# Patient Record
Sex: MALE | Race: BLACK OR AFRICAN AMERICAN | Employment: UNEMPLOYED | URBAN - METROPOLITAN AREA
[De-identification: names, ages, dates, MRNs, and addresses within clinical notes are randomized per-mention and may not be internally consistent; named-entity substitution may affect disease eponyms.]

---

## 2020-09-15 ENCOUNTER — APPOINTMENT (OUTPATIENT)
Dept: GENERAL RADIOLOGY | Age: 25
End: 2020-09-15
Attending: EMERGENCY MEDICINE
Payer: COMMERCIAL

## 2020-09-15 ENCOUNTER — HOSPITAL ENCOUNTER (EMERGENCY)
Age: 25
Discharge: HOME | End: 2020-09-15
Attending: EMERGENCY MEDICINE
Payer: COMMERCIAL

## 2020-09-15 VITALS
OXYGEN SATURATION: 99 % | TEMPERATURE: 98.4 F | RESPIRATION RATE: 16 BRPM | SYSTOLIC BLOOD PRESSURE: 132 MMHG | HEART RATE: 64 BPM | DIASTOLIC BLOOD PRESSURE: 74 MMHG

## 2020-09-15 DIAGNOSIS — S81.811A LACERATION OF RIGHT LEG EXCLUDING THIGH, INITIAL ENCOUNTER: Primary | ICD-10-CM

## 2020-09-15 PROCEDURE — 99282 EMERGENCY DEPT VISIT SF MDM: CPT

## 2020-09-15 PROCEDURE — 73590 X-RAY EXAM OF LOWER LEG: CPT

## 2020-09-15 PROCEDURE — 75810000293 HC SIMP/SUPERF WND  RPR

## 2020-09-15 NOTE — ED TRIAGE NOTES
Patient arrives with complaint of Right calf pain from injury. Patient was playing with his child when his foot went through the glass screen door. He reports that it sliced his leg.   He cleaned it out the best he could prior to arrival.

## 2020-09-15 NOTE — DISCHARGE INSTRUCTIONS
Keep area clean and dry. The area was sutures, you may clean gently with some peroxide and had antibiotic ointment and Band-Aid. Allow the Steri-Strips to fall off in 5 to 7 days. Recheck for signs of infection. The sutures will fall out on their own in 1 week. Patient Education        Cuts: Care Instructions  Your Care Instructions  A cut can happen anywhere on your body. Stitches, staples, skin adhesives, or pieces of tape called Steri-Strips are sometimes used to keep the edges of a cut together and help it heal. Steri-Strips can be used by themselves or with stitches or staples. Sometimes cuts are left open. If the cut went deep and through the skin, the doctor may have closed the cut in two layers. A deeper layer of stitches brings the deep part of the cut together. These stitches will dissolve and don't need to be removed. The upper layer closure, which could be stitches, staples, Steri-Strips, or adhesive, is what you see on the cut. A cut is often covered by a bandage. The doctor has checked you carefully, but problems can develop later. If you notice any problems or new symptoms, get medical treatment right away. Follow-up care is a key part of your treatment and safety. Be sure to make and go to all appointments, and call your doctor if you are having problems. It's also a good idea to know your test results and keep a list of the medicines you take. How can you care for yourself at home? If a cut is open or closed  · Prop up the sore area on a pillow anytime you sit or lie down during the next 3 days. Try to keep it above the level of your heart. This will help reduce swelling. · Keep the cut dry for the first 24 to 48 hours. After this, you can shower if your doctor okays it. Pat the cut dry. · Don't soak the cut, such as in a bathtub. Your doctor will tell you when it's safe to get the cut wet.   · After the first 24 to 48 hours, clean the cut with soap and water 2 times a day unless your doctor gives you different instructions. ? Don't use hydrogen peroxide or alcohol, which can slow healing. ? You may cover the cut with a thin layer of petroleum jelly and a nonstick bandage. ? If the doctor put a bandage over the cut, put on a new bandage after cleaning the cut or if the bandage gets wet or dirty. · Avoid any activity that could cause your cut to reopen. · Be safe with medicines. Read and follow all instructions on the label. ? If the doctor gave you a prescription medicine for pain, take it as prescribed. ? If you are not taking a prescription pain medicine, ask your doctor if you can take an over-the-counter medicine. If the cut is closed with stitches, staples, or Steri-Strips  · Follow the above instructions for open or closed cuts. · Do not remove the stitches or staples on your own. Your doctor will tell you when to come back to have the stitches or staples removed. · Leave Steri-Strips on until they fall off. If the cut is closed with a skin adhesive  · Follow the above instructions for open or closed cuts. · Leave the skin adhesive on your skin until it falls off on its own. This may take 5 to 10 days. · Do not scratch, rub, or pick at the adhesive. · Do not put the sticky part of a bandage directly on the adhesive. · Do not put any kind of ointment, cream, or lotion over the area. This can make the adhesive fall off too soon. Do not use hydrogen peroxide or alcohol, which can slow healing. When should you call for help? Call your doctor now or seek immediate medical care if:    · You have new pain, or your pain gets worse.     · The skin near the cut is cold or pale or changes color.     · You have tingling, weakness, or numbness near the cut.     · The cut starts to bleed, and blood soaks through the bandage.  Oozing small amounts of blood is normal.     · You have trouble moving the area near the cut.     · You have symptoms of infection, such as:  ? Increased pain, swelling, warmth, or redness around the cut.  ? Red streaks leading from the cut.  ? Pus draining from the cut.  ? A fever. Watch closely for changes in your health, and be sure to contact your doctor if:    · The cut reopens.     · You do not get better as expected. Where can you learn more? Go to http://raymond-kurtis.info/  Enter M735 in the search box to learn more about \"Cuts: Care Instructions. \"  Current as of: June 26, 2019               Content Version: 12.6  © 9378-2067 [employer]. Care instructions adapted under license by [employer] (which disclaims liability or warranty for this information). If you have questions about a medical condition or this instruction, always ask your healthcare professional. [address] any warranty or liability for your use of this information.

## 2020-09-15 NOTE — ED PROVIDER NOTES
60-year-old male accidentally hit a sliding glass door with his foot. The glass broke. Complains of lacerations to his right leg. Tetanus up-to-date. No numbness or weakness. The history is provided by the patient. Laceration    The incident occurred 1 to 2 hours ago. The laceration is located on the right leg. The injury mechanism is broken glass. Foreign body present: unknown. Possible foreign bodies present include glass. The pain is mild. Pertinent negatives include no numbness and no weakness. The patient's last tetanus shot was less than 5 years ago. No past medical history on file. No past surgical history on file. No family history on file.     Social History     Socioeconomic History    Marital status: SINGLE     Spouse name: Not on file    Number of children: Not on file    Years of education: Not on file    Highest education level: Not on file   Occupational History    Not on file   Social Needs    Financial resource strain: Not on file    Food insecurity     Worry: Not on file     Inability: Not on file    Transportation needs     Medical: Not on file     Non-medical: Not on file   Tobacco Use    Smoking status: Not on file   Substance and Sexual Activity    Alcohol use: Not on file    Drug use: Not on file    Sexual activity: Not on file   Lifestyle    Physical activity     Days per week: Not on file     Minutes per session: Not on file    Stress: Not on file   Relationships    Social connections     Talks on phone: Not on file     Gets together: Not on file     Attends Zoroastrianism service: Not on file     Active member of club or organization: Not on file     Attends meetings of clubs or organizations: Not on file     Relationship status: Not on file    Intimate partner violence     Fear of current or ex partner: Not on file     Emotionally abused: Not on file     Physically abused: Not on file     Forced sexual activity: Not on file   Other Topics Concern    Not on file   Social History Narrative    Not on file         ALLERGIES: Sulfa (sulfonamide antibiotics)    Review of Systems   Neurological: Negative for weakness and numbness. There were no vitals filed for this visit. Physical Exam  Vitals signs and nursing note reviewed. Constitutional:       Appearance: He is not ill-appearing. Skin:     General: Skin is warm and dry. Comments: Multiple small lacerations anterior lateral aspect of the right tib-fib region. Distal neurovascular intact. No active bleeding. Neurological:      Mental Status: He is alert. MDM  Number of Diagnoses or Management Options  Diagnosis management comments: Imaging to rule out glass foreign body. Clean wounds and assess for need it may need sutures. Amount and/or Complexity of Data Reviewed  Tests in the radiology section of CPT®: ordered and reviewed    Risk of Complications, Morbidity, and/or Mortality  Presenting problems: low  Diagnostic procedures: minimal  Management options: low    Patient Progress  Patient progress: stable         Wound Repair    Date/Time: 9/15/2020 7:52 PM  Preparation: skin prepped with Betadine  Location details: right leg  Wound length:2.5 cm or less  Anesthesia: local infiltration    Anesthesia:  Local Anesthetic: lidocaine 1% without epinephrine  Foreign bodies: no foreign bodies  Irrigation solution: saline  Skin closure: gut  Number of sutures: 5  Technique: simple  Approximation: close  Dressing: 4x4 and antibiotic ointment  Comments: 2 lacerations with total about 2 cm repaired with plain gut. Very superficial but gapped lacerations. All the lacerations which were superficial came together well with quarter inch Steri-Strips. Results Include:    No results found for this or any previous visit (from the past 24 hour(s)). Xr Tib/fib Rt    Result Date: 9/15/2020  EXAMINATION: XR TIB/FIB RT 9/15/2020 6:49 PM COMPARISON: None available.   INDICATION: R leg pain and laceration, rule out glass foreign body TECHNIQUE: 2 views of the right tibia and fibula were obtained FINDINGS: There is no fracture or acute abnormality. Joint spacing and alignment are maintained. Bony mineralization is preserved. The surrounding soft tissues are normal. No radiopaque foreign body. IMPRESSION: No acute abnormality. No radiopaque foreign body is identified. Of note, subtle glass can be radiolucent.

## 2020-09-15 NOTE — LETTER
[address] [address] EMERGENCY DEPT 
[address] [facility] [id number] [phone] Work/School Note Date: 9/15/2020 To Whom It May concern: 
 
Porter Solorzano was seen and treated today in the emergency room by the following provider(s): 
Attending Provider: Vilma Peoples 13 may return to work on 9/17. Sincerely, Masha Mon MD

## 2024-05-28 ENCOUNTER — APPOINTMENT (OUTPATIENT)
Dept: GENERAL RADIOLOGY | Age: 29
End: 2024-05-28
Payer: OTHER MISCELLANEOUS

## 2024-05-28 ENCOUNTER — HOSPITAL ENCOUNTER (EMERGENCY)
Age: 29
Discharge: HOME | End: 2024-05-28
Payer: OTHER MISCELLANEOUS

## 2024-05-28 VITALS
RESPIRATION RATE: 16 BRPM | TEMPERATURE: 97.9 F | DIASTOLIC BLOOD PRESSURE: 73 MMHG | HEIGHT: 67 IN | WEIGHT: 185 LBS | HEART RATE: 64 BPM | SYSTOLIC BLOOD PRESSURE: 125 MMHG | OXYGEN SATURATION: 99 % | BODY MASS INDEX: 29.03 KG/M2
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
## 2024-05-28 DIAGNOSIS — S39.012A STRAIN OF LUMBAR REGION, INITIAL ENCOUNTER: ICD-10-CM

## 2024-05-28 PROCEDURE — 6370000000 HC RX 637 (ALT 250 FOR IP)

## 2024-05-28 PROCEDURE — 72080 X-RAY EXAM THORACOLMB 2/> VW: CPT

## 2024-05-28 PROCEDURE — 99284 EMERGENCY DEPT VISIT MOD MDM: CPT

## 2024-05-28 RX ORDER — ALBUTEROL SULFATE 90 UG/1
2 AEROSOL, METERED RESPIRATORY (INHALATION) EVERY 4 HOURS PRN
COMMUNITY
Start: 2021-12-08

## 2024-05-28 RX ORDER — METHOCARBAMOL 750 MG/1
750 TABLET, FILM COATED ORAL 3 TIMES DAILY PRN
Qty: 30 TABLET | Refills: 0 | Status: SHIPPED | OUTPATIENT
Start: 2024-05-28 | End: 2024-06-07

## 2024-05-28 RX ORDER — IBUPROFEN 800 MG/1
800 TABLET ORAL
Status: COMPLETED | OUTPATIENT
Start: 2024-05-28 | End: 2024-05-28

## 2024-05-28 RX ADMIN — IBUPROFEN 800 MG: 800 TABLET, FILM COATED ORAL at 21:12

## 2024-05-28 ASSESSMENT — PAIN DESCRIPTION - LOCATION
LOCATION: BACK
LOCATION: BACK

## 2024-05-28 ASSESSMENT — PAIN SCALES - GENERAL
PAINLEVEL_OUTOF10: 6
PAINLEVEL_OUTOF10: 6

## 2024-05-28 ASSESSMENT — PAIN DESCRIPTION - ORIENTATION: ORIENTATION: LOWER

## 2024-05-28 ASSESSMENT — PAIN - FUNCTIONAL ASSESSMENT: PAIN_FUNCTIONAL_ASSESSMENT: 0-10

## 2024-05-28 ASSESSMENT — PAIN DESCRIPTION - PAIN TYPE: TYPE: ACUTE PAIN

## 2024-05-28 ASSESSMENT — LIFESTYLE VARIABLES: HOW OFTEN DO YOU HAVE A DRINK CONTAINING ALCOHOL: MONTHLY OR LESS

## 2024-05-28 ASSESSMENT — PAIN DESCRIPTION - FREQUENCY: FREQUENCY: INTERMITTENT
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
## 2024-05-29 NOTE — ED PROVIDER NOTES
nursing note reviewed:  Vitals:    05/28/24 2039   BP: 125/73   Pulse: 64   Resp: 16   Temp: 97.9 °F (36.6 °C)   TempSrc: Oral   SpO2: 99%   Weight: 83.9 kg (185 lb)   Height: 1.702 m (5' 7\")      Physical Exam  Vitals and nursing note reviewed.   Constitutional:       General: He is not in acute distress.     Appearance: Normal appearance. He is not ill-appearing.   HENT:      Head: Normocephalic and atraumatic.   Eyes:      Extraocular Movements: Extraocular movements intact.      Conjunctiva/sclera: Conjunctivae normal.      Pupils: Pupils are equal, round, and reactive to light.   Cardiovascular:      Rate and Rhythm: Normal rate.   Pulmonary:      Effort: Pulmonary effort is normal. No respiratory distress.   Musculoskeletal:         General: Tenderness present. No swelling. Normal range of motion.      Cervical back: Normal range of motion and neck supple. No rigidity.      Comments: On examination there is midline spinal tenderness in the lower thoracic upper lumbar spine with left-sided thoracic tenderness.  No palpable step-offs or deformities on examination.  Patient is ambulatory without difficulty.   Skin:     General: Skin is warm and dry.   Neurological:      Mental Status: He is alert.        Procedures     Procedures    Orders Placed This Encounter   Procedures    XR THORACOLUMBAR SPINE (MIN 2 VIEWS)        Medications given during this emergency department visit:  Medications   ibuprofen (ADVIL;MOTRIN) tablet 800 mg (800 mg Oral Given 5/28/24 2112)       New Prescriptions    METHOCARBAMOL (ROBAXIN-750) 750 MG TABLET    Take 1 tablet by mouth 3 times daily as needed (Muscle spasm)        Past Medical History:   Diagnosis Date    Asthma     Migraines         History reviewed. No pertinent surgical history.     Social History     Socioeconomic History    Marital status: Single     Spouse name: None    Number of children: None    Years of education: None    Highest education level: None   Tobacco Use
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
## 2024-06-03 ENCOUNTER — TELEPHONE (OUTPATIENT)
Dept: ORTHOPEDIC SURGERY | Age: 29
End: 2024-06-03

## 2024-06-03 ENCOUNTER — HOSPITAL ENCOUNTER (EMERGENCY)
Age: 29
Discharge: HOME | End: 2024-06-03
Attending: STUDENT IN AN ORGANIZED HEALTH CARE EDUCATION/TRAINING PROGRAM
Payer: COMMERCIAL

## 2024-06-03 VITALS
BODY MASS INDEX: 29.03 KG/M2 | OXYGEN SATURATION: 97 % | DIASTOLIC BLOOD PRESSURE: 81 MMHG | HEIGHT: 67 IN | HEART RATE: 77 BPM | SYSTOLIC BLOOD PRESSURE: 130 MMHG | RESPIRATION RATE: 18 BRPM | WEIGHT: 185 LBS | TEMPERATURE: 97.8 F

## 2024-06-03 DIAGNOSIS — S39.012A BACK STRAIN, INITIAL ENCOUNTER: Primary | ICD-10-CM

## 2024-06-03 PROCEDURE — 6370000000 HC RX 637 (ALT 250 FOR IP): Performed by: STUDENT IN AN ORGANIZED HEALTH CARE EDUCATION/TRAINING PROGRAM

## 2024-06-03 PROCEDURE — 6360000002 HC RX W HCPCS: Performed by: STUDENT IN AN ORGANIZED HEALTH CARE EDUCATION/TRAINING PROGRAM

## 2024-06-03 PROCEDURE — 99284 EMERGENCY DEPT VISIT MOD MDM: CPT

## 2024-06-03 PROCEDURE — 96372 THER/PROPH/DIAG INJ SC/IM: CPT

## 2024-06-03 RX ORDER — LIDOCAINE 50 MG/G
1 PATCH TOPICAL DAILY
Qty: 10 PATCH | Refills: 0 | Status: SHIPPED | OUTPATIENT
Start: 2024-06-03 | End: 2024-06-13

## 2024-06-03 RX ORDER — MELOXICAM 15 MG/1
15 TABLET ORAL DAILY
Qty: 30 TABLET | Refills: 0 | Status: SHIPPED | OUTPATIENT
Start: 2024-06-03

## 2024-06-03 RX ORDER — METHOCARBAMOL 750 MG/1
750 TABLET, FILM COATED ORAL 3 TIMES DAILY
Qty: 21 TABLET | Refills: 0 | Status: SHIPPED | OUTPATIENT
Start: 2024-06-03 | End: 2024-06-10

## 2024-06-03 RX ORDER — KETOROLAC TROMETHAMINE 15 MG/ML
15 INJECTION, SOLUTION INTRAMUSCULAR; INTRAVENOUS ONCE
Status: COMPLETED | OUTPATIENT
Start: 2024-06-03 | End: 2024-06-03

## 2024-06-03 RX ORDER — LIDOCAINE 4 G/G
1 PATCH TOPICAL ONCE
Status: DISCONTINUED | OUTPATIENT
Start: 2024-06-03 | End: 2024-06-03 | Stop reason: HOSPADM

## 2024-06-03 RX ADMIN — KETOROLAC TROMETHAMINE 15 MG: 15 INJECTION, SOLUTION INTRAMUSCULAR; INTRAVENOUS at 03:37

## 2024-06-03 ASSESSMENT — PAIN SCALES - GENERAL: PAINLEVEL_OUTOF10: 8

## 2024-06-03 ASSESSMENT — LIFESTYLE VARIABLES
HOW MANY STANDARD DRINKS CONTAINING ALCOHOL DO YOU HAVE ON A TYPICAL DAY: 1 OR 2
HOW OFTEN DO YOU HAVE A DRINK CONTAINING ALCOHOL: MONTHLY OR LESS

## 2024-06-03 ASSESSMENT — PAIN - FUNCTIONAL ASSESSMENT: PAIN_FUNCTIONAL_ASSESSMENT: 0-10
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
## 2024-06-03 NOTE — DISCHARGE INSTRUCTIONS
You may take Tylenol in addition to the prescribed medication.  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  Return to the ER at any time if any new or worsening symptoms or if you develop any numbness in your groin or any issues controlling your bowel or bladder.      If any further absence from work is needed, you will need to follow-up with through your suazo or through your employee health in regards to consideration of FMLA.
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
## 2024-06-05 NOTE — PROGRESS NOTES
Khloedarrelantonino Song  : 1995  Primary: Ese Dao (Ruel VALDOVINOS)  Secondary:  [facility] @ [place] [address]  [place] [address]  Phone: [phone]  Fax: [phone] Plan Frequency: 2x week up to 90 days (potentially 1x a week for up to 90 days for pt preference)  Plan of Care/Certification Expiration Date: 24        Plan of Care/Certification Expiration Date:  Plan of Care/Certification Expiration Date: 24    Frequency/Duration: Plan Frequency: 2x week up to 90 days (potentially 1x a week for up to 90 days for pt preference)      Time In/Out:   Time In: 1145  Time Out: 1219      PT Visit Info:    Total # of Visits to Date: 1  Progress Note Counter: 1      Visit Count:  1    OUTPATIENT PHYSICAL THERAPY:   Treatment Note 2024       Episode  (LBP)               Treatment Diagnosis:    No data found  Medical/Referring Diagnosis:    Back strain, initial encounter    Referring Physician:  Zain Aranda MD MD Orders:  PT Eval and Treat   Return MD Appt:     Date of Onset:  No data recorded   Allergies:   Sulfa antibiotics  Restrictions/Precautions:   None      Interventions Planned (Treatment may consist of any combination of the following):     See Assessment Note    Subjective Comments:   See eval   Initial Pain Level:: /10  Post Session Pain Level:  /10  Medications Last Reviewed:  2024  Updated Objective Findings:  See Evaluation Note from today  Treatment   THERAPEUTIC EXERCISE: (23 minutes):    Exercises per grid below to improve mobility, strength, balance and coordination.  Required no visual, verbal, manual and tactile cues to promote proper body alignment, promote proper body posture, promote proper body mechanics and promote proper body breathing techniques.  Progressed resistance, range, repetitions and complexity of movement as indicated.    THERAPEUTIC ACTIVITY: (  minutes):    Therapeutic activities per grid below to improve mobility,
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
## 2024-06-06 ENCOUNTER — HOSPITAL ENCOUNTER (OUTPATIENT)
Dept: PHYSICAL THERAPY | Age: 29
Setting detail: RECURRING SERIES
Discharge: HOME | End: 2024-06-09
Attending: STUDENT IN AN ORGANIZED HEALTH CARE EDUCATION/TRAINING PROGRAM
Payer: COMMERCIAL

## 2024-06-06 DIAGNOSIS — M54.51 VERTEBROGENIC LOW BACK PAIN: ICD-10-CM

## 2024-06-06 DIAGNOSIS — M54.59 OTHER LOW BACK PAIN: Primary | ICD-10-CM

## 2024-06-06 PROCEDURE — 97110 THERAPEUTIC EXERCISES: CPT

## 2024-06-06 PROCEDURE — 97162 PT EVAL MOD COMPLEX 30 MIN: CPT

## 2024-06-12 ENCOUNTER — HOSPITAL ENCOUNTER (OUTPATIENT)
Dept: PHYSICAL THERAPY | Age: 29
Setting detail: RECURRING SERIES
Discharge: HOME | End: 2024-06-15
Attending: STUDENT IN AN ORGANIZED HEALTH CARE EDUCATION/TRAINING PROGRAM
Payer: COMMERCIAL

## 2024-06-12 PROCEDURE — 97110 THERAPEUTIC EXERCISES: CPT

## 2024-06-12 ASSESSMENT — PAIN SCALES - GENERAL: PAINLEVEL_OUTOF10: 7

## 2024-06-12 NOTE — PROGRESS NOTES
Juli Duncan  : 1995  Primary: Ese Dao (Ruel VALDOVINOS)  Secondary:  [facility] @ [place] [address]  [place] [address]  Phone: [phone]  Fax: [phone] Plan Frequency: 2x week up to 90 days (potentially 1x a week for up to 90 days for pt preference)  Plan of Care/Certification Expiration Date: 24        Plan of Care/Certification Expiration Date:  Plan of Care/Certification Expiration Date: 24    Frequency/Duration: Plan Frequency: 2x week up to 90 days (potentially 1x a week for up to 90 days for pt preference)      Time In/Out:   Time In: 1352  Time Out: 1435      PT Visit Info:    Total # of Visits to Date: 2  Progress Note Counter: 2      Visit Count:  2    OUTPATIENT PHYSICAL THERAPY:   Treatment Note 2024       Episode  (LBP)               Treatment Diagnosis:    No data found  Medical/Referring Diagnosis:    Back strain, initial encounter    Referring Physician:  Zain Aranda MD MD Orders:  PT Eval and Treat   Return MD Appt:     Date of Onset:  No data recorded   Allergies:   Sulfa antibiotics  Restrictions/Precautions:   None      Interventions Planned (Treatment may consist of any combination of the following):     See Assessment Note    Subjective Comments:  Been trying to keep moving, but walking too much makes him sore then stiffens up.      Initial Pain Level:: 7/10  Post Session Pain Level:  5/10  Medications Last Reviewed:  2024  Updated Objective Findings:  None Today  Treatment   THERAPEUTIC EXERCISE: (42 minutes):    Exercises per grid below to improve mobility, strength, balance and coordination.  Required no visual, verbal, manual and tactile cues to promote proper body alignment, promote proper body posture, promote proper body mechanics and promote proper body breathing techniques.  Progressed resistance, range, repetitions and complexity of movement as indicated.    THERAPEUTIC ACTIVITY: (  minutes):

## 2024-06-14 ENCOUNTER — HOSPITAL ENCOUNTER (OUTPATIENT)
Dept: PHYSICAL THERAPY | Age: 29
Setting detail: RECURRING SERIES
Discharge: HOME | End: 2024-06-17
Attending: STUDENT IN AN ORGANIZED HEALTH CARE EDUCATION/TRAINING PROGRAM
Payer: COMMERCIAL

## 2024-06-14 PROCEDURE — 97110 THERAPEUTIC EXERCISES: CPT

## 2024-06-14 NOTE — PROGRESS NOTES
Supine - leg outs     Attempted marching- burning  - so did leg outs x 10 B with no burn                               Treatment/Session Summary:    Treatment Assessment:   Patient did well with additional exercises today.  Did have slight strain, but not painful. Continue with core stability and stretching.      Communication/Consultation:  None today  Equipment provided today:  None  Recommendations/Intent for next treatment session: Next visit will focus on neutral spine posture strengthening, BLE strengthening and pain modulation.    >Total Treatment Billable Duration:  44 minutes   Time In: 1516  Time Out: 1601    Tata Lopez PTA         Charge Capture  Events  [facility]  Appt Desk  Attendance Report     Future Appointments   Date Time Provider Department Center   6/18/2024  3:00 PM Tan Conklin, PT [facility]   6/20/2024  3:00 PM Tan Conklin, PT [facility]   6/25/2024 11:45 AM Tan Conklin, PT [facility]   6/28/2024 11:00 AM Tata Lopez PTA [facility]   7/2/2024 11:45 AM Tan Conklin, PT [facility]   7/5/2024 11:00 AM Tan Conklin, PT [facility]

## 2024-06-18 ENCOUNTER — HOSPITAL ENCOUNTER (OUTPATIENT)
Dept: PHYSICAL THERAPY | Age: 29
Setting detail: RECURRING SERIES
Discharge: HOME | End: 2024-06-21
Attending: STUDENT IN AN ORGANIZED HEALTH CARE EDUCATION/TRAINING PROGRAM
Payer: COMMERCIAL

## 2024-06-18 PROCEDURE — 97110 THERAPEUTIC EXERCISES: CPT

## 2024-06-18 PROCEDURE — 97112 NEUROMUSCULAR REEDUCATION: CPT

## 2024-06-18 NOTE — PROGRESS NOTES
Khloedarrelantonino Song  : 1995  Primary: Ese Dao (Ruel VALDOVINOS)  Secondary:  [facility] @ [place] [address]  [place] [address]  Phone: [phone]  Fax: [phone] Plan Frequency: 2x week up to 90 days (potentially 1x a week for up to 90 days for pt preference)  Plan of Care/Certification Expiration Date: 24        Plan of Care/Certification Expiration Date:  Plan of Care/Certification Expiration Date: 24    Frequency/Duration: Plan Frequency: 2x week up to 90 days (potentially 1x a week for up to 90 days for pt preference)      Time In/Out:   Time In: 1500  Time Out: 1540      PT Visit Info:    Total # of Visits to Date: 4  Progress Note Counter: 4      Visit Count:  4    OUTPATIENT PHYSICAL THERAPY:   Treatment Note 2024       Episode  (LBP)               Treatment Diagnosis:    No data found  Medical/Referring Diagnosis:    Back strain, initial encounter    Referring Physician:  Zain Aranda MD MD Orders:  PT Eval and Treat   Return MD Appt:     Date of Onset:  No data recorded   Allergies:   Sulfa antibiotics  Restrictions/Precautions:   None      Interventions Planned (Treatment may consist of any combination of the following):     See Assessment Note    Subjective Comments:  pt states that he felt better after last session, still has some stiffness and sharp pain after waking in the morning or sitting in a chair for a long period of time.     Initial Pain Level:: 5-6.5 /10  Post Session Pain Level:  5.5/10  Medications Last Reviewed:  2024  Updated Objective Findings:  None Today  Treatment   THERAPEUTIC EXERCISE: (15 minutes):    Exercises per grid below to improve mobility, strength, balance and coordination.  Required no visual, verbal, manual and tactile cues to promote proper body alignment, promote proper body posture, promote proper body mechanics and promote proper body breathing techniques.  Progressed resistance, range,

## 2024-06-20 ENCOUNTER — HOSPITAL ENCOUNTER (OUTPATIENT)
Dept: PHYSICAL THERAPY | Age: 29
Setting detail: RECURRING SERIES
Discharge: HOME | End: 2024-06-23
Attending: STUDENT IN AN ORGANIZED HEALTH CARE EDUCATION/TRAINING PROGRAM
Payer: COMMERCIAL

## 2024-06-20 PROCEDURE — 97110 THERAPEUTIC EXERCISES: CPT

## 2024-06-20 PROCEDURE — 97112 NEUROMUSCULAR REEDUCATION: CPT

## 2024-06-20 NOTE — PROGRESS NOTES
Khloedarrelantonino Song  : 1995  Primary: Ese Dao (Ruel VALDOVINOS)  Secondary:  [facility] @ [place] [address]  [place] [address]  Phone: [phone]  Fax: [phone] Plan Frequency: 2x week up to 90 days (potentially 1x a week for up to 90 days for pt preference)  Plan of Care/Certification Expiration Date: 24        Plan of Care/Certification Expiration Date:  Plan of Care/Certification Expiration Date: 24    Frequency/Duration: Plan Frequency: 2x week up to 90 days (potentially 1x a week for up to 90 days for pt preference)      Time In/Out:   Time In: 1502  Time Out: 1543      PT Visit Info:    Total # of Visits to Date: 5  Progress Note Counter: 5      Visit Count:  5    OUTPATIENT PHYSICAL THERAPY:   Treatment Note 2024       Episode  (LBP)               Treatment Diagnosis:    No data found  Medical/Referring Diagnosis:    Back strain, initial encounter    Referring Physician:  Zain Aranda MD MD Orders:  PT Eval and Treat   Return MD Appt:     Date of Onset:  No data recorded   Allergies:   Sulfa antibiotics  Restrictions/Precautions:   None      Interventions Planned (Treatment may consist of any combination of the following):     See Assessment Note    Subjective Comments:  pt states that he felt better after last session, still has some stiffness and sharp pain after waking in the morning or sitting in a chair for a long period of time.     Initial Pain Level:: 5-6.5 /10  Post Session Pain Level:  5.5/10  Medications Last Reviewed:  2024  Updated Objective Findings:  None Today  Treatment   THERAPEUTIC EXERCISE: (15 minutes):    Exercises per grid below to improve mobility, strength, balance and coordination.  Required no visual, verbal, manual and tactile cues to promote proper body alignment, promote proper body posture, promote proper body mechanics and promote proper body breathing techniques.  Progressed resistance, range,

## 2024-06-25 ENCOUNTER — HOSPITAL ENCOUNTER (OUTPATIENT)
Dept: PHYSICAL THERAPY | Age: 29
Setting detail: RECURRING SERIES
Discharge: HOME | End: 2024-06-28
Attending: STUDENT IN AN ORGANIZED HEALTH CARE EDUCATION/TRAINING PROGRAM
Payer: COMMERCIAL

## 2024-06-25 PROCEDURE — 97110 THERAPEUTIC EXERCISES: CPT

## 2024-06-25 PROCEDURE — 97112 NEUROMUSCULAR REEDUCATION: CPT

## 2024-06-25 NOTE — PROGRESS NOTES
Juli Song  : 1995  Primary: Ese Dao (Ruel VALDOVINOS)  Secondary:  [facility] @ [place] [address]  [place] [address]  Phone: [phone]  Fax: [phone] Plan Frequency: 2x week up to 90 days (potentially 1x a week for up to 90 days for pt preference)  Plan of Care/Certification Expiration Date: 24        Plan of Care/Certification Expiration Date:  Plan of Care/Certification Expiration Date: 24    Frequency/Duration: Plan Frequency: 2x week up to 90 days (potentially 1x a week for up to 90 days for pt preference)      Time In/Out:   Time In: 1149  Time Out: 1229      PT Visit Info:    Total # of Visits to Date: 6  Progress Note Counter: 6      Visit Count:  6    OUTPATIENT PHYSICAL THERAPY:   Treatment Note 2024       Episode  (LBP)               Treatment Diagnosis:    No data found  Medical/Referring Diagnosis:    Back strain, initial encounter    Referring Physician:  Zain Aranda MD MD Orders:  PT Eval and Treat   Return MD Appt:     Date of Onset:  No data recorded   Allergies:   Sulfa antibiotics  Restrictions/Precautions:   None      Interventions Planned (Treatment may consist of any combination of the following):     See Assessment Note    Subjective Comments:  pt states that he feels less stiffness and less sharpness of pain, pt still gets soreness at times when driving, sitting for prolonged times     Initial Pain Level:: 3 /10  Post Session Pain Level: 2/10  Medications Last Reviewed:  2024  Updated Objective Findings:  None Today  Treatment   THERAPEUTIC EXERCISE: (15 minutes):    Exercises per grid below to improve mobility, strength, balance and coordination.  Required no visual, verbal, manual and tactile cues to promote proper body alignment, promote proper body posture, promote proper body mechanics and promote proper body breathing techniques.  Progressed resistance, range, repetitions and complexity of movement

## 2024-06-28 ENCOUNTER — HOSPITAL ENCOUNTER (OUTPATIENT)
Dept: PHYSICAL THERAPY | Age: 29
Setting detail: RECURRING SERIES
Discharge: HOME | End: 2024-07-01
Attending: STUDENT IN AN ORGANIZED HEALTH CARE EDUCATION/TRAINING PROGRAM
Payer: COMMERCIAL

## 2024-06-28 PROCEDURE — 97110 THERAPEUTIC EXERCISES: CPT

## 2024-06-28 NOTE — PROGRESS NOTES
Juli Duncan  : 1995  Primary: Ese Dao (Ruel VALDOVINOS)  Secondary:  [facility] @ [place] [address]  [place] [address]  Phone: [phone]  Fax: [phone] Plan Frequency: 2x week up to 90 days (potentially 1x a week for up to 90 days for pt preference)  Plan of Care/Certification Expiration Date: 24        Plan of Care/Certification Expiration Date:  Plan of Care/Certification Expiration Date: 24    Frequency/Duration: Plan Frequency: 2x week up to 90 days (potentially 1x a week for up to 90 days for pt preference)      Time In/Out:   Time In: 1109 (9 minutes late)  Time Out: 1149      PT Visit Info:    Total # of Visits to Date: 7  Progress Note Counter: 7      Visit Count:  7    OUTPATIENT PHYSICAL THERAPY:   Treatment Note 2024       Episode  (LBP)               Treatment Diagnosis:    No data found  Medical/Referring Diagnosis:    Back strain, initial encounter    Referring Physician:  Zain Aranda MD MD Orders:  PT Eval and Treat   Return MD Appt:     Date of Onset:  No data recorded   Allergies:   Sulfa antibiotics  Restrictions/Precautions:   None      Interventions Planned (Treatment may consist of any combination of the following):     See Assessment Note    Subjective Comments:  pt states that his zoom meeting went over.  No change in symptoms.     Initial Pain Level:: 4.5  /10  Post Session Pain Level:  3.5-4/10  Medications Last Reviewed:  2024  Updated Objective Findings:  None Today  Treatment   THERAPEUTIC EXERCISE: (39 minutes):    Exercises per grid below to improve mobility, strength, balance and coordination.  Required no visual, verbal, manual and tactile cues to promote proper body alignment, promote proper body posture, promote proper body mechanics and promote proper body breathing techniques.  Progressed resistance, range, repetitions and complexity of movement as indicated.    THERAPEUTIC ACTIVITY: (

## 2024-07-02 ENCOUNTER — HOSPITAL ENCOUNTER (OUTPATIENT)
Dept: PHYSICAL THERAPY | Age: 29
Setting detail: RECURRING SERIES
Discharge: HOME | End: 2024-07-05
Attending: STUDENT IN AN ORGANIZED HEALTH CARE EDUCATION/TRAINING PROGRAM
Payer: COMMERCIAL

## 2024-07-02 PROCEDURE — 97112 NEUROMUSCULAR REEDUCATION: CPT

## 2024-07-02 PROCEDURE — 97110 THERAPEUTIC EXERCISES: CPT

## 2024-07-02 NOTE — PROGRESS NOTES
activation X 30     X 30 BTB  x 30 BTB  X 20 BTB    X 20 BTB #5     Clamshells cues for BLE hip ER with forward stacking, cues for deep core activation, neutral spine alignment throughout AROM   X 30 BTB BLE  2 x 20 B     BTB 2 x 20 B     BTB           Treatment/Session Summary:    Treatment Assessment:   See progress note     Communication/Consultation:  None today  Equipment provided today:  None  Recommendations/Intent for next treatment session: Next visit will focus on neutral spine posture strengthening, BLE strengthening and pain modulation.    >Total Treatment Billable Duration:  39 minutes   Time In: 1148  Time Out: 1228    Tan Conklin PT         Charge Capture  Events  [employer] Portal  Appt Desk  Attendance Report     Future Appointments   Date Time Provider Department Center   7/5/2024 11:00 AM Tan Conklin PT [facility]   7/15/2024 11:00 AM Tata Lopez PTA [facility]   7/19/2024 11:00 AM Tan Conklin, PT [facility]   7/22/2024 11:00 AM Tan Conklin, PT [facility]   7/26/2024 11:00 AM Tata Lopez PTA [facility]
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
## 2024-07-05 ENCOUNTER — HOSPITAL ENCOUNTER (OUTPATIENT)
Dept: PHYSICAL THERAPY | Age: 29
Setting detail: RECURRING SERIES
Discharge: HOME | End: 2024-07-08
Attending: STUDENT IN AN ORGANIZED HEALTH CARE EDUCATION/TRAINING PROGRAM
Payer: COMMERCIAL

## 2024-07-05 PROCEDURE — 97112 NEUROMUSCULAR REEDUCATION: CPT

## 2024-07-05 PROCEDURE — 97110 THERAPEUTIC EXERCISES: CPT

## 2024-07-05 NOTE — PROGRESS NOTES
BTB #5    10 x 10 secs BLE #8     Clamshells cues for BLE hip ER with forward stacking, cues for deep core activation, neutral spine alignment throughout AROM   2 x 20 B     BTB 2 x 20 B     BTB 2 x 20 B     BTB           Treatment/Session Summary:    Treatment Assessment:   Pt tolerated tx well. Pt reports improvement with lumbar stiffness and pain. Pt reports each week he is feeling better. Pt is progressing into heavier resistance training. Will progress prn.     Communication/Consultation:  None today  Equipment provided today:  None  Recommendations/Intent for next treatment session: Next visit will focus on neutral spine posture strengthening, BLE strengthening and pain modulation.    >Total Treatment Billable Duration:  39 minutes   Time In: 1103  Time Out: 1143    Tan Conklin PT         Charge Capture  Events  [employer] Portal  Appt Desk  Attendance Report     Future Appointments   Date Time Provider Department Center   7/15/2024 11:00 AM Tata Lopez PTA [facility]   7/19/2024 11:00 AM Tan Conklin, PT [facility]   7/22/2024 11:00 AM Tan Conklin, PT [facility]   7/26/2024 11:00 AM Tata Lopez, PTA [facility]

## 2024-07-15 ENCOUNTER — APPOINTMENT (OUTPATIENT)
Dept: PHYSICAL THERAPY | Age: 29
End: 2024-07-15
Attending: STUDENT IN AN ORGANIZED HEALTH CARE EDUCATION/TRAINING PROGRAM
Payer: COMMERCIAL

## 2024-07-17 ENCOUNTER — HOSPITAL ENCOUNTER (OUTPATIENT)
Dept: PHYSICAL THERAPY | Age: 29
Setting detail: RECURRING SERIES
Discharge: HOME | End: 2024-07-20
Attending: STUDENT IN AN ORGANIZED HEALTH CARE EDUCATION/TRAINING PROGRAM
Payer: COMMERCIAL

## 2024-07-17 PROCEDURE — 97110 THERAPEUTIC EXERCISES: CPT

## 2024-07-17 PROCEDURE — 97112 NEUROMUSCULAR REEDUCATION: CPT

## 2024-07-17 NOTE — PROGRESS NOTES
Therapeutic activities per grid below to improve mobility, strength, balance and coordination.  Required no visual, verbal, manual and tactile cues to promote motor control of bilateral, lower extremity(s).    NEUROMUSCULAR RE-EDUCATION: (24 minutes):    Exercise/activities per grid below to improve balance, coordination, kinesthetic sense, posture and proprioception.  Required no visual, verbal, manual and tactile cues to promote motor control of bilateral, lower extremity(s).    MANUAL THERAPY: ( minutes):   Joint mobilization, Soft tissue mobilization, Manipulation and Manual lymphatic drainage was utilized and necessary because of the patient's restricted joint motion, painful spasm, loss of articular motion and restricted motion of soft tissue.     MODALITIES: ( minutes):         Therapy in order to provide analgesia, relieve muscle spasm and reduce inflammation and edema.             Date:  6-28-24 Date:7/2/24   Date:7/5/24   Date:7/17/24     Activity/Exercise       Pt ed on biomechanics, PT POC, HEP, outcomes, prognosis, lumbar sprain/strain education, BLE ROM and early mobility education, lumbar mechanical LBP pathology v neurological compromise v soft tissue myofascial injury education, red flag s/s of neurological compromise relapse, fx education of the lumbar spine and lumbar myelopathy        SCIFIT        Treadmill grade 0 1.8-2.5 MPH 0 grade   1.8-2.5 mph   8 mins 10 mins  10 mins 10 mins   Supine SLR hamstring stretch  2 x 60 sec B  2 x 60 sec B  2 x 60 sec B  2 x 60 sec B   Supine figure 4 stretch  2 x 60 sec B 2 x 60 sec B  2 x 60 sec B  2 x 60 sec B   Single knee to chest  2 x 30 sec hold B  2 x 60 sec B  2 x 60 sec B  2 x 60 sec B   Lower trunk rotation        Supine posterior pelvic tilt yoga ball rollouts, cues for deep core activation, deep core activation, cues for BLE hip flexor inhibition 20 x 5 sec hold  20 x 5 sec hold  20 x 5 sec hold  20 x 5 sec hold   Supine bridges cues for deep core

## 2024-07-19 ENCOUNTER — HOSPITAL ENCOUNTER (OUTPATIENT)
Dept: PHYSICAL THERAPY | Age: 29
Setting detail: RECURRING SERIES
Discharge: HOME | End: 2024-07-22
Attending: STUDENT IN AN ORGANIZED HEALTH CARE EDUCATION/TRAINING PROGRAM
Payer: COMMERCIAL

## 2024-07-19 DIAGNOSIS — M54.51 VERTEBROGENIC LOW BACK PAIN: ICD-10-CM

## 2024-07-19 DIAGNOSIS — M54.59 OTHER LOW BACK PAIN: Primary | ICD-10-CM

## 2024-07-19 PROCEDURE — 97110 THERAPEUTIC EXERCISES: CPT

## 2024-07-19 PROCEDURE — 97112 NEUROMUSCULAR REEDUCATION: CPT

## 2024-07-19 NOTE — PROGRESS NOTES
30 BTB    X 30 BTB #5    10 x 10 secs BLE #8 X 30 BTB    X 30 BTB #5    10 x 10 secs BLE #8 X 30 BTB #5    X 30 BTB #8    x 10 BLE #10     Clamshells cues for BLE hip ER with forward stacking, cues for deep core activation, neutral spine alignment throughout AROM   2 x 20 B     BTB 2 x 20 B     BTB 2 x 20 B     BTB           Treatment/Session Summary:    Treatment Assessment:   Pt tolerated tx well. Pt reports improvement with lumbar stiffness and pain. Pt reports each week he is feeling better. Pt is progressing into heavier resistance training.  Pt reports more function and more strength. Will progress prn.     Communication/Consultation:  None today  Equipment provided today:  None  Recommendations/Intent for next treatment session: Next visit will focus on neutral spine posture strengthening, BLE strengthening and pain modulation.    >Total Treatment Billable Duration:  39 minutes   Time In: 1100  Time Out: 1141    Tan Conklin PT         Charge Capture  Events  [employer] Portal  Appt Desk  Attendance Report     Future Appointments   Date Time Provider Department Center   7/22/2024 11:00 AM Tan Conklin, PT [facility]   7/26/2024 11:00 AM Tata Lopez PTA [facility]

## 2024-07-22 ENCOUNTER — HOSPITAL ENCOUNTER (OUTPATIENT)
Dept: PHYSICAL THERAPY | Age: 29
Setting detail: RECURRING SERIES
Discharge: HOME | End: 2024-07-25
Attending: STUDENT IN AN ORGANIZED HEALTH CARE EDUCATION/TRAINING PROGRAM
Payer: COMMERCIAL

## 2024-07-22 PROCEDURE — 97110 THERAPEUTIC EXERCISES: CPT

## 2024-07-22 PROCEDURE — 97112 NEUROMUSCULAR REEDUCATION: CPT

## 2024-07-22 NOTE — PROGRESS NOTES
Khloedarrelantonino Song  : 1995  Primary: Ese Dao (Ruel VALDOVINOS)  Secondary:  [facility] @ [place] [address]  [place] [address]  Phone: [phone]  Fax: [phone] Plan Frequency: 2x week up to 90 days (potentially 1x a week for up to 90 days for pt preference)  Plan of Care/Certification Expiration Date: 24        Plan of Care/Certification Expiration Date:  Plan of Care/Certification Expiration Date: 24    Frequency/Duration: Plan Frequency: 2x week up to 90 days (potentially 1x a week for up to 90 days for pt preference)      Time In/Out:   Time In: 1115 (pt arrived late)  Time Out: 114      PT Visit Info:    Total # of Visits to Date: 12  Progress Note Counter: 4      Visit Count:  12    OUTPATIENT PHYSICAL THERAPY:   Treatment Note 2024       Episode  (LBP)               Treatment Diagnosis:    No data found  Medical/Referring Diagnosis:    Back strain, initial encounter    Referring Physician:  Zain Aranda MD MD Orders:  PT Eval and Treat   Return MD Appt:     Date of Onset:  No data recorded   Allergies:   Sulfa antibiotics  Restrictions/Precautions:   None      Interventions Planned (Treatment may consist of any combination of the following):     See Assessment Note    Subjective Comments:  pt states that he is feeling better, less stiffness, some soreness at times, overall feels pretty good today     Initial Pain Level:: 3 /10  Post Session Pain Level:  2/10  Medications Last Reviewed:  2024  Updated Objective Findings:  None Today  Treatment   THERAPEUTIC EXERCISE: (15  minutes):    Exercises per grid below to improve mobility, strength, balance and coordination.  Required no visual, verbal, manual and tactile cues to promote proper body alignment, promote proper body posture, promote proper body mechanics and promote proper body breathing techniques.  Progressed resistance, range, repetitions and complexity of movement as

## 2024-07-26 ENCOUNTER — HOSPITAL ENCOUNTER (OUTPATIENT)
Dept: PHYSICAL THERAPY | Age: 29
Setting detail: RECURRING SERIES
Discharge: HOME | End: 2024-07-29
Attending: STUDENT IN AN ORGANIZED HEALTH CARE EDUCATION/TRAINING PROGRAM
Payer: COMMERCIAL

## 2024-07-26 PROCEDURE — 97110 THERAPEUTIC EXERCISES: CPT

## 2024-07-26 NOTE — PROGRESS NOTES
Khloedarrelantonino Song  : 1995  Primary: Ese Dao (Ruel VALDOVINOS)  Secondary:  [facility] @ [place] [address]  [place] [address]  Phone: [phone]  Fax: [phone] Plan Frequency: 2x week up to 90 days (potentially 1x a week for up to 90 days for pt preference)  Plan of Care/Certification Expiration Date: 24        Plan of Care/Certification Expiration Date:  Plan of Care/Certification Expiration Date: 24    Frequency/Duration: Plan Frequency: 2x week up to 90 days (potentially 1x a week for up to 90 days for pt preference)      Time In/Out:   Time In: 1104 (Patient 4 minutes late)  Time Out: 1145       PT Visit Info:    Total # of Visits to Date: 13  Progress Note Counter: 5      Visit Count:  13    OUTPATIENT PHYSICAL THERAPY:   Treatment Note 2024       Episode  (LBP)               Treatment Diagnosis:    No data found  Medical/Referring Diagnosis:    Back strain, initial encounter    Referring Physician:  Zain Aranda MD MD Orders:  PT Eval and Treat   Return MD Appt:     Date of Onset:  No data recorded   Allergies:   Sulfa antibiotics  Restrictions/Precautions:   None      Interventions Planned (Treatment may consist of any combination of the following):     See Assessment Note    Subjective Comments:  Patient states he is doing pretty good.  Headed to [place] this weekend for his girlfriend's birthday.   Will be walking a good bit.     Initial Pain Level::   3.5 /10  Post Session Pain Level:  2/10  Medications Last Reviewed:  2024  Updated Objective Findings:  None Today  Treatment   THERAPEUTIC EXERCISE: (40  minutes):    Exercises per grid below to improve mobility, strength, balance and coordination.  Required no visual, verbal, manual and tactile cues to promote proper body alignment, promote proper body posture, promote proper body mechanics and promote proper body breathing techniques.  Progressed resistance, range, repetitions

## 2024-07-29 ENCOUNTER — HOSPITAL ENCOUNTER (OUTPATIENT)
Dept: PHYSICAL THERAPY | Age: 29
Setting detail: RECURRING SERIES
Discharge: HOME | End: 2024-08-01
Attending: STUDENT IN AN ORGANIZED HEALTH CARE EDUCATION/TRAINING PROGRAM
Payer: COMMERCIAL

## 2024-07-29 PROCEDURE — 97112 NEUROMUSCULAR REEDUCATION: CPT

## 2024-07-29 PROCEDURE — 97110 THERAPEUTIC EXERCISES: CPT
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
## 2024-08-01 ENCOUNTER — HOSPITAL ENCOUNTER (OUTPATIENT)
Dept: PHYSICAL THERAPY | Age: 29
Setting detail: RECURRING SERIES
Discharge: HOME | End: 2024-08-04
Attending: STUDENT IN AN ORGANIZED HEALTH CARE EDUCATION/TRAINING PROGRAM
Payer: COMMERCIAL

## 2024-08-01 PROCEDURE — 97110 THERAPEUTIC EXERCISES: CPT

## 2024-08-01 NOTE — PROGRESS NOTES
indicated.    THERAPEUTIC ACTIVITY: (  minutes):    Therapeutic activities per grid below to improve mobility, strength, balance and coordination.  Required no visual, verbal, manual and tactile cues to promote motor control of bilateral, lower extremity(s).    NEUROMUSCULAR RE-EDUCATION: ( minutes):    Exercise/activities per grid below to improve balance, coordination, kinesthetic sense, posture and proprioception.  Required no visual, verbal, manual and tactile cues to promote motor control of bilateral, lower extremity(s).    MANUAL THERAPY: ( minutes):   Joint mobilization, Soft tissue mobilization, Manipulation and Manual lymphatic drainage was utilized and necessary because of the patient's restricted joint motion, painful spasm, loss of articular motion and restricted motion of soft tissue.     MODALITIES: ( minutes):         Therapy in order to provide analgesia, relieve muscle spasm and reduce inflammation and edema.             Date:7/19/24   Date:7/22/24   Date:  7-26-24 Date:7/29/24   Date:  8-1-24   Activity/Exercise        Pt ed on biomechanics, PT POC, HEP, outcomes, prognosis, lumbar sprain/strain education, BLE ROM and early mobility education, lumbar mechanical LBP pathology v neurological compromise v soft tissue myofascial injury education, red flag s/s of neurological compromise relapse, fx education of the lumbar spine and lumbar myelopathy         SCIFIT         Treadmill grade 0 1.8-2.5 MPH 10 mins 5 mins  Grade 1  2.0 - 2.5 mph   10 minutes  Grade 1  2.0 - 2.5 mph   10 minutes  Grade 2  1.8 - 2.4 mph   6 minutes   Supine SLR hamstring stretch  2 x 60 sec B 2 x 60 sec B 2 x 60 sec hold  2 x 60 sec hold  2 x 60 sec hold    Supine figure 4 stretch  2 x 60 sec B 2 x 60 sec B 2 x 60 sec hold 2 x 60 sec hold  2 x 60 sec hold    Single knee to chest  2 x 60 sec B       Lower trunk rotation      With legs on green ball   X 20 B   Supine posterior pelvic tilt yoga ball rollouts, cues for deep core

## 2024-08-05 ENCOUNTER — HOSPITAL ENCOUNTER (OUTPATIENT)
Dept: PHYSICAL THERAPY | Age: 29
Setting detail: RECURRING SERIES
Discharge: HOME | End: 2024-08-08
Attending: STUDENT IN AN ORGANIZED HEALTH CARE EDUCATION/TRAINING PROGRAM
Payer: COMMERCIAL

## 2024-08-05 PROCEDURE — 97110 THERAPEUTIC EXERCISES: CPT

## 2024-08-05 NOTE — PROGRESS NOTES
Khloedarrelantonino Song  : 1995  Primary: Ese Dao (Ruel VALDOVINOS)  Secondary:  [facility] @ [place] [address]  [place] [address]  Phone: [phone]  Fax: [phone] Plan Frequency: 2x week up to 90 days (potentially 1x a week for up to 90 days for pt preference)  Plan of Care/Certification Expiration Date: 24        Plan of Care/Certification Expiration Date:  Plan of Care/Certification Expiration Date: 24    Frequency/Duration: Plan Frequency: 2x week up to 90 days (potentially 1x a week for up to 90 days for pt preference)      Time In/Out:   Time In:   Time Out: 1512    PT Visit Info:    Total # of Visits to Date: 15  Progress Note Counter: 7      Visit Count:  16    OUTPATIENT PHYSICAL THERAPY:   Treatment Note 2024       Episode  (LBP)               Treatment Diagnosis:    No data found  Medical/Referring Diagnosis:    Back strain, initial encounter    Referring Physician:  Zain Aranda MD MD Orders:  PT Eval and Treat   Return MD Appt:     Date of Onset:  No data recorded   Allergies:   Sulfa antibiotics  Restrictions/Precautions:   None      Interventions Planned (Treatment may consist of any combination of the following):     See Assessment Note    Subjective Comments:  Pt reports more soreness today after having lifted a few boxes in a room today they were cleaning out.       Initial Pain Level::   4-4.5 /10  Post Session Pain Level:  2.5/10  Medications Last Reviewed:  2024  Updated Objective Findings:  None Today  Treatment   THERAPEUTIC EXERCISE: (41 minutes):    Exercises per grid below to improve mobility, strength, balance and coordination.  Required no visual, verbal, manual and tactile cues to promote proper body alignment, promote proper body posture, promote proper body mechanics and promote proper body breathing techniques.  Progressed resistance, range, repetitions and complexity of movement as

## 2024-08-07 ENCOUNTER — HOSPITAL ENCOUNTER (OUTPATIENT)
Dept: PHYSICAL THERAPY | Age: 29
Setting detail: RECURRING SERIES
Discharge: HOME | End: 2024-08-10
Attending: STUDENT IN AN ORGANIZED HEALTH CARE EDUCATION/TRAINING PROGRAM
Payer: COMMERCIAL

## 2024-08-07 PROCEDURE — 97110 THERAPEUTIC EXERCISES: CPT

## 2024-08-07 NOTE — PROGRESS NOTES
green ball   X 20 B With legs on green ball   X 20 B With legs on green ball   X 20 B   Supine posterior pelvic tilt yoga ball rollouts, cues for deep core activation, deep core activation, cues for BLE hip flexor inhibition 20 x 5 sec hold 20 x 5 sec hold   DKTC   20 x  5 sec hold  DKTC   20 x 5 sec hold  DKTC   20 x 5 sec hold    Supine bridges cues for deep core activation, posterior pelvic tilts, BLE hip extensor activation X 30 BTB #5    X 30 BTB #8    x 10 BLE #10   X 30 BTB #7    x 10 BLE #10   7#     20 x 5 sec hold    10#    20 x 5 sec hold   7#     20 x 5 sec hold    10#    20 x 5 sec hold   5 # 15 x 10 sec hold  w/ BTB    7#  15 x 10 sec hold w/ BTB  5#  15 x 10 sec hold w/ BTB    7#  15 x 10 sec hold w/ BTB     Single leg bridge with 1 leg on green ball     X 10 B   (Last one 5 sec hold)     7#  15 x 10 sec   Hold w/ BTB      10# 15 x 10 sec  Hold w/ BTB    Single leg bridge with 1 leg on green ball     X 10 B   (Last one 5 sec hold)     Clamshells cues for BLE hip ER with forward stacking, cues for deep core activation, neutral spine alignment throughout AROM   2 x 20 B     BTB    BTB  2 x 20 B  BTB  2 x 20 B  BTB     2 x 20 B BTB   2 x 20  BTB   X 40 B           Treatment/Session Summary:    Treatment Assessment:   Patient with less fatigue and better form and alignment today.  Good hold times and endurance with session.   Communication/Consultation:  None today  Equipment provided today:  None  Recommendations/Intent for next treatment session: Next visit will focus on neutral spine posture strengthening, BLE strengthening and pain modulation.    >Total Treatment Billable Duration: 42 minutes   Time In: 1430  Time Out: 1513    Tata Lopez PTA         Charge Capture  Events  [employer] Portal  Appt Desk  Attendance Report     Future Appointments   Date Time Provider Department Center   8/13/2024 11:45 AM Tan Conklin, ANTONIETA [facility]ANEY   8/16/2024 11:45 AM Tan Conklin, ANTONIETA CHANEY

## 2024-08-13 ENCOUNTER — HOSPITAL ENCOUNTER (OUTPATIENT)
Dept: PHYSICAL THERAPY | Age: 29
Setting detail: RECURRING SERIES
Discharge: HOME | End: 2024-08-16
Attending: STUDENT IN AN ORGANIZED HEALTH CARE EDUCATION/TRAINING PROGRAM
Payer: COMMERCIAL

## 2024-08-13 PROCEDURE — 97112 NEUROMUSCULAR REEDUCATION: CPT

## 2024-08-13 PROCEDURE — 97110 THERAPEUTIC EXERCISES: CPT

## 2024-08-13 NOTE — PROGRESS NOTES
Khloedarrelantonino Song  : 1995  Primary: Ese Dao (Ruel VALDOVINOS)  Secondary:  [facility] @ [place] [address]  [place] [address]  Phone: [phone]  Fax: [phone] Plan Frequency: 2x week up to 90 days (potentially 1x a week for up to 90 days for pt preference)  Plan of Care/Certification Expiration Date: 24        Plan of Care/Certification Expiration Date:  Plan of Care/Certification Expiration Date: 24    Frequency/Duration: Plan Frequency: 2x week up to 90 days (potentially 1x a week for up to 90 days for pt preference)      Time In/Out:   Time In: 1152  Time Out: 1231       PT Visit Info:    Total # of Visits to Date: 18  Progress Note Counter: 10      Visit Count:  18    OUTPATIENT PHYSICAL THERAPY:   Treatment Note 2024       Episode  (LBP)               Treatment Diagnosis:    No data found  Medical/Referring Diagnosis:    Back strain, initial encounter    Referring Physician:  Zain Aranda MD MD Orders:  PT Eval and Treat   Return MD Appt:     Date of Onset:  No data recorded   Allergies:   Sulfa antibiotics  Restrictions/Precautions:   None      Interventions Planned (Treatment may consist of any combination of the following):     See Assessment Note    Subjective Comments:  Pt reports he feels a little sore when waking this morning     Initial Pain Level::   2.5 /10  Post Session Pain Level:  2/10  Medications Last Reviewed:  2024  Updated Objective Findings:  None Today  Treatment   THERAPEUTIC EXERCISE: (15  minutes):    Exercises per grid below to improve mobility, strength, balance and coordination.  Required no visual, verbal, manual and tactile cues to promote proper body alignment, promote proper body posture, promote proper body mechanics and promote proper body breathing techniques.  Progressed resistance, range, repetitions and complexity of movement as indicated.    THERAPEUTIC ACTIVITY: (  minutes):    Therapeutic
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
## 2024-08-16 ENCOUNTER — HOSPITAL ENCOUNTER (OUTPATIENT)
Dept: PHYSICAL THERAPY | Age: 29
Setting detail: RECURRING SERIES
Discharge: HOME | End: 2024-08-19
Attending: STUDENT IN AN ORGANIZED HEALTH CARE EDUCATION/TRAINING PROGRAM
Payer: COMMERCIAL

## 2024-08-16 PROCEDURE — 97112 NEUROMUSCULAR REEDUCATION: CPT

## 2024-08-16 PROCEDURE — 97110 THERAPEUTIC EXERCISES: CPT

## 2024-08-16 NOTE — PROGRESS NOTES
Khloedarrelantonino Song  : 1995  Primary: Ese Dao (Ruel VALDOVINOS)  Secondary:  [facility] @ [place] [address]  [place] [address]  Phone: [phone]  Fax: [phone] Plan Frequency: 2x week up to 90 days (potentially 1x a week for up to 90 days for pt preference)  Plan of Care/Certification Expiration Date: 24        Plan of Care/Certification Expiration Date:  Plan of Care/Certification Expiration Date: 24    Frequency/Duration: Plan Frequency: 2x week up to 90 days (potentially 1x a week for up to 90 days for pt preference)      Time In/Out:   Time In: 1150  Time Out: 1230       PT Visit Info:    Total # of Visits to Date: 19  Progress Note Counter: 1      Visit Count:  19    OUTPATIENT PHYSICAL THERAPY:   Treatment Note 2024       Episode  (LBP)               Treatment Diagnosis:    No data found  Medical/Referring Diagnosis:    Back strain, initial encounter    Referring Physician:  Zain Aranda MD MD Orders:  PT Eval and Treat   Return MD Appt:     Date of Onset:  No data recorded   Allergies:   Sulfa antibiotics  Restrictions/Precautions:   None      Interventions Planned (Treatment may consist of any combination of the following):     See Assessment Note    Subjective Comments:  Pt reports he feels a little sore when walking in today    Initial Pain Level::   2 /10  Post Session Pain Level:  0/10  Medications Last Reviewed:  2024  Updated Objective Findings:  None Today  Treatment   THERAPEUTIC EXERCISE: (15  minutes):    Exercises per grid below to improve mobility, strength, balance and coordination.  Required no visual, verbal, manual and tactile cues to promote proper body alignment, promote proper body posture, promote proper body mechanics and promote proper body breathing techniques.  Progressed resistance, range, repetitions and complexity of movement as indicated.    THERAPEUTIC ACTIVITY: (  minutes):    Therapeutic activities

## 2024-08-21 ENCOUNTER — HOSPITAL ENCOUNTER (OUTPATIENT)
Dept: PHYSICAL THERAPY | Age: 29
Setting detail: RECURRING SERIES
Discharge: HOME | End: 2024-08-24
Attending: STUDENT IN AN ORGANIZED HEALTH CARE EDUCATION/TRAINING PROGRAM
Payer: COMMERCIAL

## 2024-08-21 PROCEDURE — 97112 NEUROMUSCULAR REEDUCATION: CPT

## 2024-08-21 PROCEDURE — 97110 THERAPEUTIC EXERCISES: CPT

## 2024-08-21 NOTE — PROGRESS NOTES
per grid below to improve mobility, strength, balance and coordination.  Required no visual, verbal, manual and tactile cues to promote motor control of bilateral, lower extremity(s).    NEUROMUSCULAR RE-EDUCATION: (23 minutes):    Exercise/activities per grid below to improve balance, coordination, kinesthetic sense, posture and proprioception.  Required no visual, verbal, manual and tactile cues to promote motor control of bilateral, lower extremity(s).    MANUAL THERAPY: ( minutes):   Joint mobilization, Soft tissue mobilization, Manipulation and Manual lymphatic drainage was utilized and necessary because of the patient's restricted joint motion, painful spasm, loss of articular motion and restricted motion of soft tissue.     MODALITIES: ( minutes):         Therapy in order to provide analgesia, relieve muscle spasm and reduce inflammation and edema.             Date:  8-7-24 Date:8/13/24   Date:8/16/24   Date:8/21/24     Activity/Exercise       Pt ed on biomechanics, PT POC, HEP, outcomes, prognosis, lumbar sprain/strain education, BLE ROM and early mobility education, lumbar mechanical LBP pathology v neurological compromise v soft tissue myofascial injury education, red flag s/s of neurological compromise relapse, fx education of the lumbar spine and lumbar myelopathy        SCIFIT        Treadmill grade 0 1.8-2.5 MPH Grade 2  2.0 - 2.5 mph   10 minutes  Grade 2  2.0 - 2.5 mph   10 minutes  Grade 2  2.0 - 2.5 mph   10 minutes  Grade 2  2.0 - 2.5 mph   10 minutes    Supine SLR hamstring stretch  2 x 60 sec B 2 x 60 sec B 2 x 60 sec B 2 x 60 sec B   Supine figure 4 stretch  2 x 60 sec B 2 x 60 sec B 2 x 60 sec B 2 x 60 sec B   Single knee to chest        Lower trunk rotation  With legs on green ball   X 20 B With legs on green ball   X 20 B  With legs on green ball   X 20 B   Supine posterior pelvic tilt yoga ball rollouts, cues for deep core activation, deep core activation, cues for BLE hip flexor inhibition

## 2024-08-23 ENCOUNTER — APPOINTMENT (OUTPATIENT)
Dept: PHYSICAL THERAPY | Age: 29
End: 2024-08-23
Attending: STUDENT IN AN ORGANIZED HEALTH CARE EDUCATION/TRAINING PROGRAM
Payer: COMMERCIAL

## 2024-08-30 ENCOUNTER — HOSPITAL ENCOUNTER (OUTPATIENT)
Dept: PHYSICAL THERAPY | Age: 29
Setting detail: RECURRING SERIES
End: 2024-08-30
Attending: STUDENT IN AN ORGANIZED HEALTH CARE EDUCATION/TRAINING PROGRAM
Payer: COMMERCIAL

## 2024-08-30 PROCEDURE — 97110 THERAPEUTIC EXERCISES: CPT

## 2024-08-30 PROCEDURE — 97112 NEUROMUSCULAR REEDUCATION: CPT

## 2024-08-30 NOTE — PROGRESS NOTES
Khloedarrelantonino Song  : 1995  Primary: Ese Dao (Ruel VALDOVINOS)  Secondary:  [facility] @ [place] [address]  [place] [address]  Phone: [phone]  Fax: [phone] Plan Frequency: 2x week up to 90 days (potentially 1x a week for up to 90 days for pt preference)  Plan of Care/Certification Expiration Date: 24        Plan of Care/Certification Expiration Date:  Plan of Care/Certification Expiration Date: 24    Frequency/Duration: Plan Frequency: 2x week up to 90 days (potentially 1x a week for up to 90 days for pt preference)      Time In/Out:   Time In: 1433 (Patient 3 minutes late)  Time Out: 1516   1433 - 3 minutes late    PT Visit Info:    Total # of Visits to Date: 21  Progress Note Counter: 3      Visit Count:  21    OUTPATIENT PHYSICAL THERAPY:   Treatment Note 2024       Episode  (LBP)               Treatment Diagnosis:    No data found  Medical/Referring Diagnosis:    Back strain, initial encounter    Referring Physician:  Zain Aranda MD MD Orders:  PT Eval and Treat   Return MD Appt:     Date of Onset:  No data recorded   Allergies:   Sulfa antibiotics  Restrictions/Precautions:   None      Interventions Planned (Treatment may consist of any combination of the following):     See Assessment Note    Subjective Comments:  Pt reports he has been more sore, but has been trying to do more. (Been sitting more with back unsupported, been lifting like a case of water, etc))    Initial Pain Level::   3-4 /10  Post Session Pain Level:  2/10  Medications Last Reviewed:  2024  Updated Objective Findings:  None Today  Treatment   THERAPEUTIC EXERCISE: (17  minutes):    Exercises per grid below to improve mobility, strength, balance and coordination.  Required no visual, verbal, manual and tactile cues to promote proper body alignment, promote proper body posture, promote proper body mechanics and promote proper body breathing techniques.

## 2024-09-04 ENCOUNTER — HOSPITAL ENCOUNTER (OUTPATIENT)
Dept: PHYSICAL THERAPY | Age: 29
Setting detail: RECURRING SERIES
Discharge: HOME | End: 2024-09-07
Attending: STUDENT IN AN ORGANIZED HEALTH CARE EDUCATION/TRAINING PROGRAM
Payer: COMMERCIAL

## 2024-09-04 PROCEDURE — 97112 NEUROMUSCULAR REEDUCATION: CPT

## 2024-09-04 PROCEDURE — 97110 THERAPEUTIC EXERCISES: CPT

## 2024-09-04 NOTE — THERAPY RECERTIFICATION
decreased pain and increased functional mobility. Pt still reports stiffness, soreness at times, and occasional sharp pain when waking in the morning. Pt would benefit to continue with skilled PT in order to address his remaining impairments.    Pt has reached his third progress visit, Pt has attended 22 PT visits while completing 6/8 DC goals, with the remaining goals in progress towards completion. Pt is being seen for LBP. Pt has made progress with BLE strength, BLE WB stamina, neutral spine posture strength and improvement with pain intensity. Pt still reports stiffness, lumbar hypomobility present and intermittent hip hypomobility. Pt would benefit to continue with skilled PT in order to address his remaining impairments. Pt's PT POC has been extended to 12/3/24.    Therapy Problem List: (Impacting functional limitations):    Increased Pain, Decreased Strength, Decreased ROM, Decreased Functional Mobility, Decreased Loup with Home Exercise Program, Decreased Posture, Decreased Body Mechanics, and Decreased Activity Tolerance/Endurance*   Therapy Prognosis:   Good     Initial Assessment Complexity:   Moderate Complexity       PLAN   Effective Dates: 9/4/24 TO Plan of Care/Certification Expiration Date: 12/03/24     Frequency/Duration: Plan Frequency: 2x week up to 90 days (potentially 1x a week for up to 90 days for pt preference)      Interventions Planned (Treatment may consist of any combination of the following):    Home Exercise Program (HEP), Manual Therapy, Neuromuscular Re-education/Strengthening, Pain Management, Positioning, Range of Motion (ROM), Therapeutic Activites, and Therapeutic Exercise/Strengthening   Goals: (Goals have been discussed and agreed upon with patient.)  Short-Term Functional Goals: Time Frame: 4 weeks   Pt will achieve a 32/50 Modified Oswestry Low Back Pain Questionnaire score in order to improve ADLs and function MET  Pt will be compliant with HEP  MET  Pt will achieve a

## 2024-09-04 NOTE — PROGRESS NOTES
Khloedarrelantonino Song  : 1995  Primary: Ese Dao (Ruel VALDOVINOS)  Secondary:  [facility] @ [place] [address]  [place] [address]  Phone: [phone]  Fax: [phone] Plan Frequency: 2x week up to 90 days (potentially 1x a week for up to 90 days for pt preference)  Plan of Care/Certification Expiration Date: 24        Plan of Care/Certification Expiration Date:  Plan of Care/Certification Expiration Date: 24    Frequency/Duration: Plan Frequency: 2x week up to 90 days (potentially 1x a week for up to 90 days for pt preference)      Time In/Out:   Time In: 1502  Time Out: 1540       PT Visit Info:    Total # of Visits to Date: 22  Progress Note Counter: 4      Visit Count:  22    OUTPATIENT PHYSICAL THERAPY:   Treatment Note 2024       Episode  (LBP)               Treatment Diagnosis:    No data found  Medical/Referring Diagnosis:    Back strain, initial encounter    Referring Physician:  Arthur Live MD MD Orders:  PT Eval and Treat   Return MD Appt:     Date of Onset:  No data recorded   Allergies:   Sulfa antibiotics  Restrictions/Precautions:   None      Interventions Planned (Treatment may consist of any combination of the following):     See Assessment Note    Subjective Comments:  Pt reports some soreness from sitting for prolonged times     Initial Pain Level::   3-4 /10  Post Session Pain Level:  2/10  Medications Last Reviewed:  2024  Updated Objective Findings:  None Today  Treatment   THERAPEUTIC EXERCISE: (15  minutes):    Exercises per grid below to improve mobility, strength, balance and coordination.  Required no visual, verbal, manual and tactile cues to promote proper body alignment, promote proper body posture, promote proper body mechanics and promote proper body breathing techniques.  Progressed resistance, range, repetitions and complexity of movement as indicated.    THERAPEUTIC ACTIVITY: (  minutes):    Therapeutic

## 2024-09-16 ENCOUNTER — HOSPITAL ENCOUNTER (EMERGENCY)
Age: 29
Discharge: HOME | End: 2024-09-16
Payer: COMMERCIAL

## 2024-09-16 VITALS
DIASTOLIC BLOOD PRESSURE: 80 MMHG | RESPIRATION RATE: 15 BRPM | TEMPERATURE: 98.6 F | HEART RATE: 98 BPM | OXYGEN SATURATION: 95 % | SYSTOLIC BLOOD PRESSURE: 117 MMHG

## 2024-09-16 DIAGNOSIS — M54.50 ACUTE EXACERBATION OF CHRONIC LOW BACK PAIN: Primary | ICD-10-CM

## 2024-09-16 DIAGNOSIS — G89.29 ACUTE EXACERBATION OF CHRONIC LOW BACK PAIN: Primary | ICD-10-CM

## 2024-09-16 PROCEDURE — 99284 EMERGENCY DEPT VISIT MOD MDM: CPT

## 2024-09-16 PROCEDURE — 6360000002 HC RX W HCPCS

## 2024-09-16 PROCEDURE — 96372 THER/PROPH/DIAG INJ SC/IM: CPT

## 2024-09-16 RX ORDER — KETOROLAC TROMETHAMINE 30 MG/ML
30 INJECTION, SOLUTION INTRAMUSCULAR; INTRAVENOUS
Status: COMPLETED | OUTPATIENT
Start: 2024-09-16 | End: 2024-09-16

## 2024-09-16 RX ORDER — METHYLPREDNISOLONE 4 MG
TABLET, DOSE PACK ORAL
Qty: 1 KIT | Refills: 0 | Status: SHIPPED | OUTPATIENT
Start: 2024-09-16 | End: 2024-09-22

## 2024-09-16 RX ORDER — DEXAMETHASONE SODIUM PHOSPHATE 10 MG/ML
10 INJECTION INTRAMUSCULAR; INTRAVENOUS
Status: COMPLETED | OUTPATIENT
Start: 2024-09-16 | End: 2024-09-16

## 2024-09-16 RX ADMIN — DEXAMETHASONE SODIUM PHOSPHATE 10 MG: 10 INJECTION INTRAMUSCULAR; INTRAVENOUS at 17:29

## 2024-09-16 RX ADMIN — KETOROLAC TROMETHAMINE 30 MG: 30 INJECTION, SOLUTION INTRAMUSCULAR at 17:29

## 2024-09-16 ASSESSMENT — LIFESTYLE VARIABLES
HOW MANY STANDARD DRINKS CONTAINING ALCOHOL DO YOU HAVE ON A TYPICAL DAY: PATIENT DOES NOT DRINK
HOW OFTEN DO YOU HAVE A DRINK CONTAINING ALCOHOL: NEVER

## 2024-09-19 ENCOUNTER — HOSPITAL ENCOUNTER (OUTPATIENT)
Dept: PHYSICAL THERAPY | Age: 29
Setting detail: RECURRING SERIES
Discharge: HOME | End: 2024-09-22
Attending: STUDENT IN AN ORGANIZED HEALTH CARE EDUCATION/TRAINING PROGRAM
Payer: COMMERCIAL

## 2024-09-19 PROCEDURE — 97112 NEUROMUSCULAR REEDUCATION: CPT

## 2024-09-19 PROCEDURE — 97110 THERAPEUTIC EXERCISES: CPT

## 2024-09-25 ENCOUNTER — APPOINTMENT (OUTPATIENT)
Dept: PHYSICAL THERAPY | Age: 29
End: 2024-09-25
Attending: STUDENT IN AN ORGANIZED HEALTH CARE EDUCATION/TRAINING PROGRAM
Payer: COMMERCIAL

## 2024-09-27 ENCOUNTER — APPOINTMENT (OUTPATIENT)
Dept: PHYSICAL THERAPY | Age: 29
End: 2024-09-27
Attending: STUDENT IN AN ORGANIZED HEALTH CARE EDUCATION/TRAINING PROGRAM
Payer: COMMERCIAL